# Patient Record
Sex: MALE | Race: WHITE
[De-identification: names, ages, dates, MRNs, and addresses within clinical notes are randomized per-mention and may not be internally consistent; named-entity substitution may affect disease eponyms.]

---

## 2017-04-21 NOTE — RAD
PROCEDURE 

MRI cervical spine without contrast. 

 

HISTORY 

Neck and left shoulder pain for 3 weeks. No known injury. 

 

TECHNIQUE 

Sagittal T1, sagittal T2, sagittal STIR, axial T2, and axial T2 gradient 

sequences are provided. 

 

COMPARISON 

November 10, 2016. 

 

FINDINGS 

There is no change in alignment. There is no worrisome marrow lesion. 

Minimal endplate edema is noted at C5-C6. This appears degenerative. There

is no cord signal abnormality. The cervicomedullary junction is 

unremarkable. 

Degenerative findings by individual level are as follows: 

C2-C3: There is facet hypertrophy which is greater on the left. There is 

no canal or foraminal compromise. 

C3-C4: Disc osteophyte complex and uncinate process spurring are noted. 

There is effacement of the ventral CSF column. Midline AP diameter of the 

thecal sac is 12 millimeters. Foraminal narrowing bilaterally is 

high-grade. 

C4-C5: Disc osteophyte complex and uncinate process spurring are noted. 

There is effacement of the ventral CSF column. There is no canal stenosis,

midline AP diameter of the thecal sac 11-12 millimeters. Foraminal 

narrowing is mild-to-moderate on the right and mild on the left. 

C5-C6: Disc osteophyte complex and uncinate process spurring are noted. 

There is effacement of the ventral CSF column. Midline AP diameter of the 

thecal sac is 10 millimeters, minimal canal stenosis. Foraminal narrowing 

bilaterally is present, moderate to severe on the left and mild to 

moderate on the right. 

C6-C7: Disc osteophyte complex and uncinate process spurring is noted. 

There is no canal stenosis. Foraminal narrowing is at least moderate 

bilaterally 

C7-T1: Disc bulge and facet hypertrophy are noted with mild foraminal 

narrowing. 

 

 

IMPRESSION 

Degenerative changes noted throughout the cervical spine appear similar to

the November study. No new herniation is identified. 

 

Electronically signed by: Rodrigo Mccann MD (Apr 21, 2017 11:52:40)

## 2017-07-25 NOTE — PAIN
DATE OF SERVICE:  07/24/2017



PROGRESS NOTE FOR PAIN CLINIC



DIAGNOSES:

1.  Cervical radiculopathy with cervical spondylosis and cervical degenerative

disk disease.

2.  Myofascial pain with cervicalgia.



HISTORY OF PRESENT ILLNESS:  The patient is a 71-year-old male who returns for

followup status post cervical epidural steroid injection x 1 with about 80%

improvement and no more radiation in to his left arm.  Second visit, he had

trigger point injections in the bilateral cervical paraspinous musculature,

bilateral trapezius with about 75% improvement from that.  The patient reports

doing much better.  He is very pleased with his progress.  No further radiation

to the left upper extremity.  Pain in his neck is significantly improved.  He

has been increasing his activity with greater ease and comfort, rates his pain

as a 2-3 on a scale of 10 at its worst, is sleeping better at night.  The

patient reports no new motor or sensory deficits, still some pain in the base of

the neck, mostly on the left side with some activity and reports that he overdid

it after his last physical therapy meeting, which was _____ last injections, but

otherwise doing quite well.  The patient reports numbness and tingling has gone,

just aching pain now in the base of the neck, more on the left side, but present

bilaterally.



PHYSICAL EXAMINATION:

VITAL SIGNS:  The patient's blood pressure is 136/86, pulse 84, respirations are

20, temperature is 98.2 degrees Fahrenheit.  Weight is 303 pounds.

GENERAL:  The patient is awake, alert, oriented, appropriate, has a very

pleasant demeanor.

HEENT:  Head shows normocephalic, atraumatic.  Extraocular movements are intact

and symmetrical.  Oral cavity shows mucous membranes moist and pink.  Dentition

is intact.

NECK:  Shows anterior throat supple without palpable lymphadenopathy noted. 

Swallow reflex is symmetrical.

CHEST:  Shows normal on inspection.  Breath sounds clear to auscultation

bilaterally.

HEART:  Shows S1 and S2 clear.  No murmurs auscultated.

ABDOMEN:  Obese, soft, nontender, nondistended.  No palpable organomegaly is

noted.  No rebound or guarding demonstrated.

BACK:  Shows spine grossly in the midline.  Cervical lordotic curvature is

intact and normal in appearance.  Cervical paraspinous musculature shows

symmetrical on inspection with palpation, shows some significant tenderness and

very firm rope-like trigger point musculature in the middle and lower

distribution of the cervical paraspinous muscles bilaterally, worse on the left

than the right, but again without radiation, also into the left trapezius in the

superior medial aspect, very firm rope-like musculature consistent with trigger

point areas of muscle, very mildly on the right side only at the superior medial

aspect of the trapezius, but less painful, but again radiation not demonstrated

on any aspect of the trigger points on palpation.

EXTREMITIES:  Upper extremities showed deep tendon reflexes at 1+ in the biceps

and triceps tendons.  Motor exam is strong with  strength rated at 5/5 _____

biceps and triceps flexion.  Peripheral pulses are 2+ in radial distribution. 

No peripheral edema is noted.



Options were discussed with the patient.  The patient's old chart was reviewed

as his current medication regimen and updated.  Current review of systems

updated today as well.  We will proceed with the trigger point injections of the

bilateral trapezius and bilateral cervical paraspinous musculature as outlined

on exam.  Risks were again discussed including, but not limited to bleeding,

infection, possibility of intravascular injection sequelae, spread of local

anesthetic and numbness, side effects of steroid medications and poor results

regarding pain control.  The patient understands and wishes to proceed.  The

patient will return to clinic in approximately 2 weeks for followup, was

counseled on return appointment, activity level, and side effects to be aware

of.



DIAGNOSIS:  Cervicalgia with myofascial pain.



PROCEDURE:  Trigger point injections of bilateral cervical paraspinous

musculature as well as the bilateral trapezius musculature using sterile prep

and drape with local anesthetic.  Medication injected is a total of 9 mL of

0.25% bupivacaine and 40 mg total of Depo-Medrol.



CONDITION AT DISCHARGE:  Stable.  The patient tolerated the procedure well, had

no complications.

 



______________________________

MARIE ARANA MD



DR:  [email or web address]  JOB#:  [id number] / [id number]

DD:  07/24/2017 13:24  DT:  07/25/2017 00:40

## 2018-12-20 ENCOUNTER — HOSPITAL ENCOUNTER (OUTPATIENT)
Dept: HOSPITAL 61 - KCIC MRI | Age: 73
Discharge: HOME | End: 2018-12-20
Payer: MEDICARE

## 2018-12-20 DIAGNOSIS — M47.896: ICD-10-CM

## 2018-12-20 DIAGNOSIS — M25.78: ICD-10-CM

## 2018-12-20 DIAGNOSIS — M51.46: ICD-10-CM

## 2018-12-20 DIAGNOSIS — M51.37: ICD-10-CM

## 2018-12-20 DIAGNOSIS — M48.061: ICD-10-CM

## 2018-12-20 DIAGNOSIS — M51.16: Primary | ICD-10-CM

## 2018-12-20 PROCEDURE — 72148 MRI LUMBAR SPINE W/O DYE: CPT

## 2018-12-20 NOTE — [facility]
MRI Lumbar Spine without contrast

 

History: Low back pain, degenerative disc disease, left radiculopathy

 

Technique: Multiplanar, multi sequential noncontrast MR imaging was 

performed of the lumbar spine.

 

Comparison: None

 

Findings: 

Lumbar vertebral body stature is overall maintained other than some 

Schmorl's nodes. There is minimal posterior subluxation of L2 relative L3 

and L3 relative L4. There is straightening of the lumbar spine. Conus 

terminates at the superior aspect of L2. There is variable moderate to 

severe degenerative disc disease L5-S1, L3-4, L2-3, to a somewhat lesser 

degree at L1-L2 and L4-5. There is endplate edema probably L4-5, L3-4, 

inferiorly of L2, very minimally at L1-2 likely reactive/degenerative in 

etiology. There is very mild levoscoliosis centered near L1-2. There is a 

T2 hyperintense lesion of the visualized right kidney about 1.7 cm, most 

likely a cyst.

 

L1-L2:  There is protrusion eccentric to the far right lateral recess and 

inferior right neural foramen superimposed on disc osteophyte complex, 

mild narrowing of the far right lateral recess. There is mild narrowing of

the inferior right neural foramen, left neural foramen adequate.

 

L2-L3:  There is a disc osteophyte complex and bulge superimposed on the 

posteriorly subluxed L2 vertebral body margin with indentation upon the 

ventral thecal sac greater in the lateral recesses. There is mild buckling

of the ligamentum flavum and mild to moderate facet hypertrophic change. 

There is mild narrowing of the far lateral recesses bilaterally. There is 

mild narrowing of the right neural foramen greater distally, disc 

osteophyte complex and bulge also contacting the extraforaminal right L2 

nerve root, left neural foramen adequate.

 

L3-L4:  There is moderate to severe buckling of the ligamentum flavum and 

moderate facet degenerative change. There is prominence of posterior 

epidural fat. There is minimal disc osteophyte complex and bulge 

superimposed on the posteriorly subluxed L3 vertebral body margin. 

Combination of findings results in overall severe spinal stenosis with 

limited preserved subarachnoid space, lateral recess stenosis bilaterally 

somewhat greater on the left. There is moderate narrowing of the left 

neural foramen, disc osteophyte complex and bulge also contacting the 

extraforaminal left L3 nerve root. There is mild narrowing of the right 

neural foramen although also contact of the extraforaminal right L3 nerve 

root by protrusion.

 

L4-L5:  There is moderate facet degenerative change and mild-to-moderate 

buckling of the ligamentum flavum. Disc osteophyte complex and bulge. 

There is also superimposed partially contained extrusion eccentric to left

lateral recess, slight extent above the intervertebral disc space, 

greatest dimension about 9 to 10 mm CC by 7 mm AP by about 8 mm 

transverse. There is severe left lateral recess stenosis with impingement 

of the descending left L5 nerve root. There is mild prominence of 

posterior epidural fat. There is mild-to-moderate right lateral recess 

stenosis and narrowing of the central canal. There is moderate to severe 

narrowing of the left neural foramen with contact undersurface exiting 

left L4 nerve root extending to the extraforaminal region by disc 

osteophyte complex and extrusion. Right neural foramen is adequate.

 

L5-S1:  There is disc osteophyte complex and bulge, contact of the 

descending S1 nerve roots greater on the left, also mild to moderate facet

degenerative change and mild buckling of the ligamentum flavum. There is 

moderate to severe left lateral recess stenosis, mild right lateral recess

stenosis. There is severe neural foramina compromise bilaterally with 

impingement of the exiting L5 nerve root, narrowing due to disc osteophyte

complex in combination with facet degenerative change.

 

Impression: 

 

1.  There is severe spinal stenosis L3-4. There is severe left lateral 

recess stenosis at L4-5 and to a slightly lesser degree on the left at 

L5-S1 as described.

2. There is multilevel lumbar neural foramina compromise as stated, more 

significant narrowing bilaterally at L5-S1 and on the left at L4-5, to a 

lesser degree on the left at L3-4. There is also contact of the 

extraforaminal nerve roots as stated most notable left L4 nerve root at 

L4-5.

3. There is multilevel variable moderate to severe degenerative disc 

disease greatest L2-3, L3-4, L5-S1. There is multilevel variable endplate 

edema greatest at L3-4 likely reactive/degenerative in etiology. There is 

multilevel mild spondylosis. There is multilevel facet degenerative 

change, mild abnormal alignment as stated.

 

Electronically signed by: Scottie Garcia MD (12/20/2018 2:52 PM) 

[id number]

## 2019-07-16 ENCOUNTER — HOSPITAL ENCOUNTER (OUTPATIENT)
Dept: HOSPITAL 63 - SURG | Age: 74
Discharge: HOME | End: 2019-07-16
Attending: ANESTHESIOLOGY
Payer: MEDICARE

## 2019-07-16 DIAGNOSIS — M54.16: ICD-10-CM

## 2019-07-16 DIAGNOSIS — M25.511: ICD-10-CM

## 2019-07-16 DIAGNOSIS — M47.812: ICD-10-CM

## 2019-07-16 DIAGNOSIS — G89.4: Primary | ICD-10-CM

## 2019-07-16 DIAGNOSIS — M25.512: ICD-10-CM

## 2019-07-16 DIAGNOSIS — M17.0: ICD-10-CM

## 2019-07-16 PROCEDURE — 99204 OFFICE O/P NEW MOD 45 MIN: CPT
